# Patient Record
Sex: MALE | HISPANIC OR LATINO
[De-identification: names, ages, dates, MRNs, and addresses within clinical notes are randomized per-mention and may not be internally consistent; named-entity substitution may affect disease eponyms.]

---

## 2021-11-30 ENCOUNTER — APPOINTMENT (OUTPATIENT)
Dept: ORTHOPEDIC SURGERY | Facility: CLINIC | Age: 41
End: 2021-11-30
Payer: MEDICAID

## 2021-11-30 VITALS — WEIGHT: 175 LBS

## 2021-11-30 DIAGNOSIS — M79.673 PAIN IN UNSPECIFIED FOOT: ICD-10-CM

## 2021-11-30 PROBLEM — Z00.00 ENCOUNTER FOR PREVENTIVE HEALTH EXAMINATION: Status: ACTIVE | Noted: 2021-11-30

## 2021-11-30 PROCEDURE — 73630 X-RAY EXAM OF FOOT: CPT | Mod: LT

## 2021-11-30 PROCEDURE — 99203 OFFICE O/P NEW LOW 30 MIN: CPT

## 2021-12-01 NOTE — PHYSICAL EXAM
[de-identified] : Left ankle\par \par Constitutional: \par The patient is healthy-appearing and in no apparent distress. \par \par Gait and Station: \par The patient ambulates with a normal gait. \par \par Cardiovascular System: \par The capillary refill is less than 2 seconds. \par \par Skin: \par There are no skin abnormalities of ankle.\par \par Ankles and Feet: \par Inspection: \par There is no erythema.\par There is no induration.\par There is no warmth.\par There is pes planus and severe heel valgus.  \par There is no swelling. \par \par Bony Palpation: \par There is no tenderness of the calcaneal tuberosity.\par There is no tenderness of the metatarsals.\par There is tenderness of the tarsometatarsal joints\par There is no tenderness of the navicular tuberosity. \par There is no tenderness of the dome of talus.\par There is no tenderness of the head of talus.\par There is no tenderness of the inferior tibiofibular joint.\par \par Soft Tissue Palpation: \par There is no tenderness of the tibialis posterior.\par There is no tenderness of the tibialis anterior. \par There is no tenderness of the plantar fascia.\par There is no tenderness of the Achilles tendon.\par There is no tenderness of the extensor hallucis longus.\par There is no tenderness of the sinus tarsi. \par There is no tenderness of the peroneus longus and brevis.\par There is no tenderness of the deltoid ligament.   \par There is no tenderness of the anterior talofibular ligament and the calcaneofibular ligament. \par \par Active Range of Motion: \par The range of motion at the ankle is full. \par \par Stability: \par The anterior drawer is negative. \par \par Strength: \par There is 5/5 ankle plantarflexion and dorsiflexion.\par \par Neurological System: \par There is normal sensation to light touch at the ankle and foot. \par \par Psychiatric: \par The patient demonstrates a normal mood and affect and is active and alert. [de-identified] : Given patient's reported history and physical examination, x-ray evaluation ( as listed below ) was ordered and performed to aid in diagnosis and treatment of the patient.\par X-ray left foot.  There is no significant bony / soft tissue abnormality, arthritis, or fracture.

## 2021-12-01 NOTE — HISTORY OF PRESENT ILLNESS
[de-identified] : Location: Left foot- doral\par Duration: August 2021\par Context: fell off the sidewalk \par Quality: sharp\par Aggravating factors: walking, worse at night\par Patient states history and exam performed with a [place] translation ( via [employer] ).  States persistent pain on the dorsal aspect of the foot primarily with prolonged walking or any twisting activity.\par

## 2021-12-01 NOTE — ASSESSMENT
[FreeTextEntry1] : Discussed at length with patient concerning over the location of pain in a possible Lisfranc injury and will obtain an MRI for further evaluation.

## 2021-12-15 ENCOUNTER — APPOINTMENT (OUTPATIENT)
Dept: ORTHOPEDIC SURGERY | Facility: CLINIC | Age: 41
End: 2021-12-15

## 2021-12-16 ENCOUNTER — APPOINTMENT (OUTPATIENT)
Dept: ORTHOPEDIC SURGERY | Facility: CLINIC | Age: 41
End: 2021-12-16
Payer: MEDICAID

## 2021-12-16 DIAGNOSIS — S93.609A UNSPECIFIED SPRAIN OF UNSPECIFIED FOOT, INITIAL ENCOUNTER: ICD-10-CM

## 2021-12-16 PROCEDURE — 99214 OFFICE O/P EST MOD 30 MIN: CPT

## 2021-12-16 RX ORDER — NABUMETONE 500 MG/1
500 TABLET, FILM COATED ORAL
Qty: 30 | Refills: 2 | Status: ACTIVE | COMMUNITY
Start: 2021-12-16 | End: 1900-01-01

## 2021-12-16 NOTE — HISTORY OF PRESENT ILLNESS
[de-identified] : Patient is an established patient last seen 2 weeks ago presenting for follow up evaluation and discussion in regards to his foot.  He states overall still some discomfort but improved since last evaluation.  History and exam performed with  via [employer]\par \par

## 2021-12-16 NOTE — ASSESSMENT
[FreeTextEntry1] : Discussed at length with patient exam MRI and treatment at this time for observation with arch supports and an anti-inflammatory.  Discussed that this may take several months to resolve his persistent symptoms in 6 weeks to follow up in office\par

## 2021-12-16 NOTE — PHYSICAL EXAM
[de-identified] : Left ankle\par \par Constitutional: \par The patient is healthy-appearing and in no apparent distress. \par \par Gait and Station: \par The patient ambulates with a normal gait. \par \par Cardiovascular System: \par The capillary refill is less than 2 seconds. \par \par Skin: \par There are no skin abnormalities of ankle.\par \par Ankles and Feet: \par Inspection: \par There is no erythema.\par There is no induration.\par There is no warmth.\par There is pes planus and severe heel valgus.  \par There is no swelling. \par \par Bony Palpation: \par There is no tenderness of the calcaneal tuberosity.\par There is no tenderness of the metatarsals.\par There is tenderness of the tarsometatarsal joints\par There is no tenderness of the navicular tuberosity. \par There is no tenderness of the dome of talus.\par There is no tenderness of the head of talus.\par There is no tenderness of the inferior tibiofibular joint.\par \par Soft Tissue Palpation: \par There is no tenderness of the tibialis posterior.\par There is no tenderness of the tibialis anterior. \par There is no tenderness of the plantar fascia.\par There is no tenderness of the Achilles tendon.\par There is no tenderness of the extensor hallucis longus.\par There is no tenderness of the sinus tarsi. \par There is no tenderness of the peroneus longus and brevis.\par There is no tenderness of the deltoid ligament.   \par There is no tenderness of the anterior talofibular ligament and the calcaneofibular ligament. \par \par Active Range of Motion: \par The range of motion at the ankle is full. \par \par Stability: \par The anterior drawer is negative. \par \par Strength: \par There is 5/5 ankle plantarflexion and dorsiflexion.\par \par Neurological System: \par There is normal sensation to light touch at the ankle and foot. \par \par Psychiatric: \par The patient demonstrates a normal mood and affect and is active and alert. [de-identified] : MRI of foot reveals no evidence of significant abnormality within the midfoot and no significance of arthritis other than minimal.  There is no clear fracture in the area of concern and no clear Lisfranc injury

## 2023-05-08 ENCOUNTER — APPOINTMENT (OUTPATIENT)
Dept: ORTHOPEDIC SURGERY | Facility: CLINIC | Age: 43
End: 2023-05-08
Payer: MEDICAID

## 2023-05-08 VITALS — BODY MASS INDEX: 28.46 KG/M2 | HEIGHT: 68.5 IN | WEIGHT: 190 LBS

## 2023-05-08 DIAGNOSIS — M62.838 OTHER MUSCLE SPASM: ICD-10-CM

## 2023-05-08 PROCEDURE — 73030 X-RAY EXAM OF SHOULDER: CPT | Mod: RT

## 2023-05-08 PROCEDURE — 73630 X-RAY EXAM OF FOOT: CPT | Mod: LT

## 2023-05-08 PROCEDURE — 99214 OFFICE O/P EST MOD 30 MIN: CPT

## 2023-05-08 PROCEDURE — 72040 X-RAY EXAM NECK SPINE 2-3 VW: CPT

## 2023-05-08 RX ORDER — NABUMETONE 500 MG/1
500 TABLET, FILM COATED ORAL
Qty: 60 | Refills: 0 | Status: ACTIVE | COMMUNITY
Start: 2023-05-08 | End: 1900-01-01

## 2023-05-08 RX ORDER — METAXALONE 800 MG/1
800 TABLET ORAL 3 TIMES DAILY
Qty: 30 | Refills: 1 | Status: ACTIVE | COMMUNITY
Start: 2023-05-08 | End: 1900-01-01

## 2023-05-08 NOTE — REASON FOR VISIT
[Follow-Up Visit] : a follow-up visit for [Shoulder Pain] : shoulder pain [FreeTextEntry2] : Right Shoulder / Left Foot

## 2023-05-08 NOTE — PHYSICAL EXAM
[de-identified] : Right Shoulder:\par Constitutional:\par The patient is healthy-appearing and in no apparent distress. \par \par Cardiovascular System: \par The capillary refill is less than 2 seconds. \par \par Skin: \par There are no skin abnormalities.\par \par C-Spine/Neck:\par \par Active Range of Motion:\par Flexion				50\par Extension			60\par Lateral rotation			80  \par \par Right Shoulder: \par Inspection: \par There is no atrophy, erythema, warmth, swelling.\par There is no scapular winging.\par There is no AC prominence. \par \par Bony Palpation: \par There is no tenderness of the clavicle.\par There is no tenderness of the acromioclavicular joint.\par There is no tenderness of the greater tuberosity. \par There is no tenderness of the bicipital groove.\par  \par Soft Tissue Palpation: \par There is tenderness of the trapezius.\par There is tenderness of the rhomboid.\par There is no tenderness of the subacromial bursa. \par \par Active Range of Motion: \par Forward flexion- 				180 \par Abduction-					150\par External rotation at 0 degrees abduction-	80 \par Internal rotation at 0 degrees abduction-	80\par \par Passive Range of Motion: \par Forward flexion- 			180 \par Abduction-				150\par External rotation at 0 deg abduction-	80 \par Internal rotation at 0 deg abduction-	80\par \par Special Tests: \par Hawkin's  				Negative \par Neer's  				Negative\par Speed's  				Negative\par AC cross-over 			            Negative\par Addison's  				Negative\par \par Stability: \par There is no general laxity. \par \par Psychiatric: \par The patient demonstrates a normal mood and affect and is active and alert.\par \par Left foot\par \par Gait and Station: \par The patient ambulates with a normal gait and no limp. \par \par Cardiovascular System: \par The capillary refill is less than 2 seconds. \par \par Skin: \par There is a healed midfoot dorsal longitudinal 3 cm incision with swelling around this area with mild tenderness to palpation consistent with soft tissue swelling fluid\par \par Ankles and Feet: \par Inspection: \par There is no erythema.\par There is no induration.\par There is no warmth.\par There is no deformity.  \par There is no swelling. \par \par Bony Palpation: \par There is no tenderness of the calcaneal tuberosity.\par There is no tenderness of the metatarsals.\par There is no tenderness of the tarsometatarsal joints\par There is no tenderness of the navicular tuberosity. \par There is no tenderness of the dome of talus.\par There is no tenderness of the head of talus.\par There is no tenderness of the inferior tibiofibular joint.\par \par Soft Tissue Palpation: \par There is no tenderness of the tibialis posterior.\par There is no tenderness of the tibialis anterior. \par There is no tenderness of the plantar fascia.\par There is no tenderness of the Achilles tendon.\par There is no tenderness of the extensor hallucis longus.\par There is no tenderness of the sinus tarsi. \par There is no tenderness of the peroneus longus and brevis.\par There is no tenderness of the deltoid ligament.   \par There is no tenderness of the anterior talofibular ligament and the calcaneofibular ligament. \par \par Active Range of Motion: \par The range of motion at the ankle is full. \par \par Stability: \par The anterior drawer is negative. \par \par Strength: \par There is 5/5 ankle plantarflexion and dorsiflexion.\par \par Neurological System: \par There is decreased sensation to the small fourth and fifth toes on the dorsum of the foot. \par \par  [de-identified] : X-ray cervical spine.  There is loss of lordosis with mild kyphosis\par X-ray right shoulder.  There is no significant bony / soft tissue abnormality, arthritis, or fracture.\par X-ray left foot.  There is no significant bony / soft tissue abnormality, arthritis, or fracture.

## 2023-05-08 NOTE — HISTORY OF PRESENT ILLNESS
[de-identified] : Established patient Follow up on Left Foot / New Pain Right [place]\par [employer] \par Last Visit: 12/16/2021 - Left Foot\par Patient states that he had surgery on his left midfoot laterally for a cyst in [place] 2 months ago and is having persistent pain and discomfort with recurrence of swelling.  Does not have any pathology report does not know anything more than what he just described as a cyst\par Symptoms: Numbness at Tarsal Digits 4 and 5 - SInce ER visit\par ___\par Reason: Right Shoulder- Unknown Reason/ Denies Trauma / Injury \par Duration: 2 Days\par Aggrvt: Sleeping Position / Lifting above shoulder height\par Alllvt: Pain Med\par Pain Med: Tylenol\par Allergies: N/A\par \par

## 2023-05-08 NOTE — ASSESSMENT
[FreeTextEntry1] : Discussed at length with patient via [place] [employer] translation that we will need an MRI of his left foot given his minimal history and's current swelling for further evaluation.  Recommendation as well for physical therapy home exercises medication and moist heat for the paracervical spasms

## 2023-05-31 ENCOUNTER — APPOINTMENT (OUTPATIENT)
Dept: ORTHOPEDIC SURGERY | Facility: CLINIC | Age: 43
End: 2023-05-31
Payer: MEDICAID

## 2023-05-31 DIAGNOSIS — M79.672 PAIN IN LEFT FOOT: ICD-10-CM

## 2023-05-31 PROCEDURE — 99213 OFFICE O/P EST LOW 20 MIN: CPT

## 2023-06-02 PROBLEM — M79.672 LEFT FOOT PAIN: Status: ACTIVE | Noted: 2021-11-30

## 2023-06-02 NOTE — HISTORY OF PRESENT ILLNESS
[de-identified] : Patient is an established patient presenting for discussion of LEFT foot MRI\par \par

## 2023-06-02 NOTE — REASON FOR VISIT
[Follow-Up Visit] : a follow-up visit for [FreeTextEntry2] : LEFT foot; MR review @ [facility] 5/19/23

## 2023-06-02 NOTE — PHYSICAL EXAM
[de-identified] : Left foot\par \par Gait and Station: \par The patient ambulates with a normal gait and no limp. \par \par Cardiovascular System: \par The capillary refill is less than 2 seconds. \par \par Skin: \par There is a healed midfoot dorsal longitudinal 3 cm incision with swelling around this area with mild tenderness to palpation consistent with soft tissue swelling fluid\par \par Ankles and Feet: \par Inspection: \par There is no erythema.\par There is no induration.\par There is no warmth.\par There is no deformity.  \par There is no swelling. \par \par Bony Palpation: \par There is no tenderness of the calcaneal tuberosity.\par There is no tenderness of the metatarsals.\par There is no tenderness of the tarsometatarsal joints\par There is no tenderness of the navicular tuberosity. \par There is no tenderness of the dome of talus.\par There is no tenderness of the head of talus.\par There is no tenderness of the inferior tibiofibular joint.\par \par Soft Tissue Palpation: \par There is no tenderness of the tibialis posterior.\par There is no tenderness of the tibialis anterior. \par There is no tenderness of the plantar fascia.\par There is no tenderness of the Achilles tendon.\par There is no tenderness of the extensor hallucis longus.\par There is no tenderness of the sinus tarsi. \par There is no tenderness of the peroneus longus and brevis.\par There is no tenderness of the deltoid ligament.   \par There is no tenderness of the anterior talofibular ligament and the calcaneofibular ligament. \par \par Active Range of Motion: \par The range of motion at the ankle is full. \par \par Stability: \par The anterior drawer is negative. \par \par Strength: \par There is 5/5 ankle plantarflexion and dorsiflexion.\par \par Neurological System: \par There is decreased sensation to the small fourth and fifth toes on the dorsum of the foot. \par \par  [de-identified] : MRI left foot:  There is mild edema at the dorsolateral foot.

## 2023-06-02 NOTE — ASSESSMENT
[FreeTextEntry1] : Discussed at length with patient via [employer] and recommendation for observation.